# Patient Record
Sex: FEMALE | Race: WHITE | Employment: OTHER | URBAN - METROPOLITAN AREA
[De-identification: names, ages, dates, MRNs, and addresses within clinical notes are randomized per-mention and may not be internally consistent; named-entity substitution may affect disease eponyms.]

---

## 2019-11-20 DIAGNOSIS — M25.512 ACUTE PAIN OF LEFT SHOULDER: Primary | ICD-10-CM

## 2019-11-21 ENCOUNTER — OFFICE VISIT (OUTPATIENT)
Dept: ORTHOPEDIC SURGERY | Age: 79
End: 2019-11-21
Payer: MEDICARE

## 2019-11-21 VITALS — HEIGHT: 62 IN | BODY MASS INDEX: 30 KG/M2 | WEIGHT: 163 LBS

## 2019-11-21 DIAGNOSIS — M19.012 GLENOHUMERAL ARTHRITIS, LEFT: Primary | ICD-10-CM

## 2019-11-21 PROCEDURE — 20610 DRAIN/INJ JOINT/BURSA W/O US: CPT | Performed by: ORTHOPAEDIC SURGERY

## 2019-11-21 PROCEDURE — 99203 OFFICE O/P NEW LOW 30 MIN: CPT | Performed by: ORTHOPAEDIC SURGERY

## 2019-11-21 RX ORDER — HYDRALAZINE HYDROCHLORIDE 25 MG/1
TABLET, FILM COATED ORAL
Refills: 1 | COMMUNITY
Start: 2019-09-21

## 2019-11-21 RX ORDER — TRIAMCINOLONE ACETONIDE 40 MG/ML
40 INJECTION, SUSPENSION INTRA-ARTICULAR; INTRAMUSCULAR ONCE
Status: COMPLETED | OUTPATIENT
Start: 2019-11-21 | End: 2019-11-21

## 2019-11-21 RX ORDER — METOPROLOL TARTRATE 50 MG/1
TABLET, FILM COATED ORAL
Refills: 1 | COMMUNITY
Start: 2019-09-21

## 2019-11-21 RX ORDER — GABAPENTIN 300 MG/1
CAPSULE ORAL
Refills: 2 | COMMUNITY
Start: 2019-11-18

## 2019-11-21 RX ORDER — DULOXETIN HYDROCHLORIDE 30 MG/1
CAPSULE, DELAYED RELEASE ORAL
Refills: 2 | COMMUNITY
Start: 2019-10-08

## 2019-11-21 RX ORDER — ALENDRONATE SODIUM 35 MG/1
TABLET ORAL
Refills: 0 | COMMUNITY
Start: 2019-10-04

## 2019-11-21 RX ADMIN — TRIAMCINOLONE ACETONIDE 40 MG: 40 INJECTION, SUSPENSION INTRA-ARTICULAR; INTRAMUSCULAR at 09:53

## 2020-06-18 ENCOUNTER — OFFICE VISIT (OUTPATIENT)
Dept: ORTHOPEDIC SURGERY | Age: 80
End: 2020-06-18
Payer: MEDICARE

## 2020-06-18 VITALS — WEIGHT: 163 LBS | BODY MASS INDEX: 30 KG/M2 | HEIGHT: 62 IN

## 2020-06-18 PROCEDURE — 99213 OFFICE O/P EST LOW 20 MIN: CPT | Performed by: ORTHOPAEDIC SURGERY

## 2020-06-18 NOTE — PROGRESS NOTES
Chief Complaint   Patient presents with    Arm Pain     Right bicep pain for the past couple weeks. She reports pain started after pushing . Montse Jensen is a 70y.o. year old   female who is seen today  for evaluation of right shoulder and arm pain. She reports the pain has been ongoing for the past 2 weeks. She does not recall a specific injury which started the pain. She did note pain after mowing the lawn. She states the pain is in her bicep. She reports the pain is worse with moving the arm, better with rest.  The patient does not have mechanical symptoms. Shedoes have night pain. She denies a feeling of instability. The prior treatments have been none. The patient   has not responded to the treatment. The patient is right hand dominant. Chief Complaint   Patient presents with    Arm Pain     Right bicep pain for the past couple weeks. She reports pain started after pushing .      Past Medical History:   Diagnosis Date    Arthritis     Cancer ([address]     cervical- hysterectomy    HTN (hypertension)     Osteoarthritis      Past Surgical History:   Procedure Laterality Date    AORTIC VALVE REPLACEMENT  2008    pig valve    CARDIAC SURGERY      HYSTERECTOMY  1977    PACEMAKER INSERTION  2008    PACEMAKER PLACEMENT  2008    last checked 6/2013-dr weeks    SHOULDER ARTHROPLASTY Right OCTOBER 16, 2013    RIGHT MEG-ARTHROPLASTY       Current Outpatient Medications:     DULoxetine (CYMBALTA) 30 MG extended release capsule, TAKE ONE CAPSULE BY MOUTH EVERY DAY, Disp: , Rfl: 2    gabapentin (NEURONTIN) 300 MG capsule, TAKE ONE CAPSULE BY MOUTH TWO TIMES A DAY, Disp: , Rfl: 2    alendronate (FOSAMAX) 35 MG tablet, TAKE ONE TABLET BY MOUTH ONCE A WEEK IN THE MORNING  AT LEAST 30 MIN BEFORE FIRST FOOD  BEVERAGE  OR MEDICATION OF DAY., Disp: , Rfl: 0    hydrALAZINE (APRESOLINE) 25 MG tablet, TAKE ONE TABLET BY MOUTH TWO TIMES A DAY, Disp: , Rfl: 1    metoprolol

## 2023-03-23 ENCOUNTER — HOSPITAL ENCOUNTER (EMERGENCY)
Age: 83
Discharge: HOME | End: 2023-03-23
Payer: MEDICARE

## 2023-03-23 ENCOUNTER — APPOINTMENT (OUTPATIENT)
Dept: GENERAL RADIOLOGY | Age: 83
End: 2023-03-23
Payer: MEDICARE

## 2023-03-23 VITALS
DIASTOLIC BLOOD PRESSURE: 67 MMHG | HEART RATE: 61 BPM | WEIGHT: 144 LBS | TEMPERATURE: 98 F | OXYGEN SATURATION: 98 % | RESPIRATION RATE: 16 BRPM | BODY MASS INDEX: 26.5 KG/M2 | SYSTOLIC BLOOD PRESSURE: 116 MMHG | HEIGHT: 62 IN

## 2023-03-23 DIAGNOSIS — S42.201A CLOSED FRACTURE OF PROXIMAL END OF RIGHT HUMERUS, UNSPECIFIED FRACTURE MORPHOLOGY, INITIAL ENCOUNTER: Primary | ICD-10-CM

## 2023-03-23 PROCEDURE — 99283 EMERGENCY DEPT VISIT LOW MDM: CPT

## 2023-03-23 PROCEDURE — 73080 X-RAY EXAM OF ELBOW: CPT

## 2023-03-23 PROCEDURE — 73030 X-RAY EXAM OF SHOULDER: CPT

## 2023-03-23 PROCEDURE — 73060 X-RAY EXAM OF HUMERUS: CPT

## 2023-03-23 RX ORDER — ACETAMINOPHEN 325 MG/1
650 TABLET ORAL EVERY 6 HOURS PRN
COMMUNITY

## 2023-03-23 ASSESSMENT — PAIN SCALES - GENERAL: PAINLEVEL_OUTOF10: 10

## 2023-03-23 ASSESSMENT — PAIN DESCRIPTION - ORIENTATION: ORIENTATION: RIGHT

## 2023-03-23 ASSESSMENT — PAIN DESCRIPTION - DESCRIPTORS: DESCRIPTORS: DISCOMFORT;SHARP;SORE

## 2023-03-23 ASSESSMENT — PAIN - FUNCTIONAL ASSESSMENT
PAIN_FUNCTIONAL_ASSESSMENT: 0-10
PAIN_FUNCTIONAL_ASSESSMENT: PREVENTS OR INTERFERES SOME ACTIVE ACTIVITIES AND ADLS

## 2023-03-23 ASSESSMENT — PAIN DESCRIPTION - ONSET: ONSET: ON-GOING

## 2023-03-23 ASSESSMENT — PAIN DESCRIPTION - LOCATION: LOCATION: ARM

## 2023-03-23 ASSESSMENT — PAIN DESCRIPTION - FREQUENCY: FREQUENCY: INTERMITTENT

## 2023-03-24 NOTE — ED PROVIDER NOTES
[address] ENCOUNTER        Pt Name: Cameron Smith  MRN: [id number]  [place] 1940  Date of evaluation: 3/23/2023  Provider: MARKEL Palomares CNP  PCP: Luciano Roberts MD  Note Started: 9:50 PM EDT 3/23/23      MINH. I have evaluated this patient. My supervising physician was available for consultation. CHIEF COMPLAINT       Chief Complaint   Patient presents with    Fall     Tripped going down steps and landed in driveway onto right arm. No LOC, No head injury       HISTORY OF PRESENT ILLNESS: 1 or more Elements     History from : Patient    Limitations to history : None    Cameron Smith is a [de-identified] y.o. female who presents department for a fall. Patient states that she missed the last step when she was walking down her outside steps and fell onto her right shoulder. Patient states that she did not hit her head or lose consciousness she denies any other pain in any other place. Patient states that she was able to stand up immediately after it happened. She states that she is having some pain into her right shoulder as well as some swelling. Patient states that she does have range of motion however it is painful. Patient denies any other symptoms including numbness tingling or weakness. Patient denies any pain in her elbow wrist or hand. Patient states that she is not on any blood thinners. Patient states movement makes her pain worse and rest makes her pain better. Nursing Notes were all reviewed and agreed with or any disagreements were addressed in the HPI. REVIEW OF SYSTEMS :      Review of Systems   All other systems reviewed and are negative. Positives and Pertinent negatives as per HPI.      SURGICAL HISTORY     Past Surgical History:   Procedure Laterality Date    AORTIC VALVE REPLACEMENT  2008    pig valve    CARDIAC SURGERY      HYSTERECTOMY (CERVIX STATUS UNKNOWN)  1977    PACEMAKER INSERTION  2008 several years ago but does not want to follow-up with him. Patient was also given Dr. Souleymane Cobb who is on-call for orthopedics at this time in case she is unable to follow-up with [facility]. Patient verbalized understanding at this time she declines any pain medication for home and prefers to just take Tylenol. Patient was educated on ice and strict return precautions. Patient verbalizes understanding her daughter is at the bedside all questions were answered patient is stable for discharge to home with close outpatient follow-up. Appropriate for outpatient management        I am the Primary Clinician of Record. FINAL IMPRESSION      1.  Closed fracture of proximal end of right humerus, unspecified fracture morphology, initial encounter          DISPOSITION/PLAN     DISPOSITION Decision To Discharge 03/23/2023 02:38:55 PM      PATIENT REFERRED TO:  Bari Martinez MD  [address] [place] [address] [phone]    Schedule an appointment as soon as possible for a visit in 2 days  for follow up    Angela Nolen, [address] [facility] [id number] [phone]    Schedule an appointment as soon as possible for a visit in 2 days  for follow up      DISCHARGE MEDICATIONS:  Discharge Medication List as of 3/23/2023  2:53 PM          DISCONTINUED MEDICATIONS:  Discharge Medication List as of 3/23/2023  2:53 PM        STOP taking these medications       alendronate (FOSAMAX) 35 MG tablet Comments:   Reason for Stopping:         hydrALAZINE (APRESOLINE) 25 MG tablet Comments:   Reason for Stopping:                      (Please note that portions of this note were completed with a voice recognition program.  Efforts were made to edit the dictations but occasionally words are mis-transcribed.)    MARKEL Alvarado CNP (electronically signed)          MARKEL Juarez CNP  03/23/23 [id number]